# Patient Record
Sex: MALE | Employment: UNEMPLOYED | URBAN - METROPOLITAN AREA
[De-identification: names, ages, dates, MRNs, and addresses within clinical notes are randomized per-mention and may not be internally consistent; named-entity substitution may affect disease eponyms.]

---

## 2017-01-17 ENCOUNTER — OFFICE VISIT (OUTPATIENT)
Dept: DERMATOLOGY | Facility: CLINIC | Age: 22
End: 2017-01-17
Payer: COMMERCIAL

## 2017-01-17 ENCOUNTER — OFFICE VISIT (OUTPATIENT)
Dept: INTERNAL MEDICINE | Facility: CLINIC | Age: 22
End: 2017-01-17
Payer: COMMERCIAL

## 2017-01-17 VITALS
SYSTOLIC BLOOD PRESSURE: 106 MMHG | OXYGEN SATURATION: 98 % | BODY MASS INDEX: 22.63 KG/M2 | HEART RATE: 96 BPM | WEIGHT: 140.8 LBS | DIASTOLIC BLOOD PRESSURE: 76 MMHG | HEIGHT: 66 IN | TEMPERATURE: 98.1 F

## 2017-01-17 VITALS — SYSTOLIC BLOOD PRESSURE: 106 MMHG | DIASTOLIC BLOOD PRESSURE: 75 MMHG | OXYGEN SATURATION: 98 % | HEART RATE: 96 BPM

## 2017-01-17 DIAGNOSIS — B36.0 TV (TINEA VERSICOLOR): ICD-10-CM

## 2017-01-17 DIAGNOSIS — L63.9 ALOPECIA AREATA: Primary | ICD-10-CM

## 2017-01-17 DIAGNOSIS — L63.9 AA (ALOPECIA AREATA): Primary | ICD-10-CM

## 2017-01-17 DIAGNOSIS — L70.0 ACNE VULGARIS: ICD-10-CM

## 2017-01-17 PROCEDURE — 11900 INJECT SKIN LESIONS </W 7: CPT | Performed by: PHYSICIAN ASSISTANT

## 2017-01-17 PROCEDURE — 99203 OFFICE O/P NEW LOW 30 MIN: CPT | Mod: 25 | Performed by: PHYSICIAN ASSISTANT

## 2017-01-17 PROCEDURE — 99213 OFFICE O/P EST LOW 20 MIN: CPT | Performed by: INTERNAL MEDICINE

## 2017-01-17 RX ORDER — FLUOCINOLONE ACETONIDE 0.1 MG/ML
SOLUTION TOPICAL
Qty: 60 ML | Refills: 3 | Status: SHIPPED | OUTPATIENT
Start: 2017-01-17

## 2017-01-17 RX ORDER — TRETINOIN 0.5 MG/G
CREAM TOPICAL
Qty: 45 G | Refills: 11 | Status: SHIPPED | OUTPATIENT
Start: 2017-01-17

## 2017-01-17 RX ORDER — KETOCONAZOLE 20 MG/G
CREAM TOPICAL
Qty: 60 G | Refills: 11 | Status: SHIPPED | OUTPATIENT
Start: 2017-01-17

## 2017-01-17 NOTE — PATIENT INSTRUCTIONS
Alopecia Areata -    I have placed dermatology referrals for you.    Please schedule an appointment at your earliest convenience - phone numbers below.

## 2017-01-17 NOTE — NURSING NOTE
"Chief Complaint   Patient presents with     Hair Loss     from beard       Initial /76 mmHg  Pulse 96  Temp(Src) 98.1  F (36.7  C) (Oral)  Ht 5' 5.5\" (1.664 m)  Wt 140 lb 12.8 oz (63.866 kg)  BMI 23.07 kg/m2  SpO2 98% Estimated body mass index is 23.07 kg/(m^2) as calculated from the following:    Height as of this encounter: 5' 5.5\" (1.664 m).    Weight as of this encounter: 140 lb 12.8 oz (63.866 kg).  BP completed using cuff size: moi Crespo CMA ([facility])      "

## 2017-01-17 NOTE — MR AVS SNAPSHOT
After Visit Summary   1/17/2017    Bere Nunn    MRN: [id number]           Patient Information     Date Of Birth          1995        Visit Information        Provider Department      1/17/2017 2:00 PM Ebony Fox MD [facility]        Today's Diagnoses     Alopecia areata    -  1       Care Instructions    Alopecia Areata -    I have placed dermatology referrals for you.    Please schedule an appointment at your earliest convenience - phone numbers below.                 Follow-ups after your visit        Additional Services     DERMATOLOGY REFERRAL       Your provider has referred you to: FMG: [facility] [phone]   [email or web address]  FHN: [facility] [place] [phone]   [email or web address]  FHN: [facility] [place] [phone]   [email or web address]    Please be aware that coverage of these services is subject to the terms and limitations of your health insurance plan.  Call member services at your health plan with any benefit or coverage questions.      Please bring the following with you to your appointment:    (1) Any X-Rays, CTs or MRIs which have been performed.  Contact the facility where they were done to arrange for  prior to your scheduled appointment.    (2) List of current medications  (3) This referral request   (4) Any documents/labs given to you for this referral                  Who to contact     If you have questions or need follow up information about today's clinic visit or your schedule please contact [facility] directly at [phone].  Normal or non-critical lab and imaging results will be communicated to you by MyChart, letter or phone within 4 business days after the clinic has received the results. If you do not hear from us within 7 days, please contact the clinic through [facility] or  "phone. If you have a critical or abnormal lab result, we will notify you by phone as soon as possible.  Submit refill requests through [employer] or call your pharmacy and they will forward the refill request to us. Please allow 3 business days for your refill to be completed.          Additional Information About Your Visit        [facility] Information     [employer] lets you send messages to your doctor, view your test results, renew your prescriptions, schedule appointments and more. To sign up, go to [email or web address] . Click on \"Log in\" on the left side of the screen, which will take you to the Welcome page. Then click on \"Sign up Now\" on the right side of the page.     You will be asked to enter the access code listed below, as well as some personal information. Please follow the directions to create your username and password.     Your access code is: [email or web address]  Expires: 2017  2:23 PM     Your access code will  in 90 days. If you need help or a new code, please call your [facility] clinic or [phone].        Care EveryWhere ID     This is your Care EveryWhere ID. This could be used by other organizations to access your [facility] medical records  [id number]        Your Vitals Were     Pulse Temperature Height BMI (Body Mass Index) Pulse Oximetry       96 98.1  F (36.7  C) (Oral) 5' 5.5\" (1.664 m) 23.07 kg/m2 98%        Blood Pressure from Last 3 Encounters:   17 106/76   12 116/74    Weight from Last 3 Encounters:   17 140 lb 12.8 oz (63.866 kg)              We Performed the Following     DERMATOLOGY REFERRAL        Primary Care Provider    Physician No Ref-Primary       No address on file        Thank you!     Thank you for choosing [facility]  for your care. Our goal is always to provide you with excellent care. Hearing back from our patients is one way we can continue to improve our services. Please take a few minutes to complete the written survey " that you may receive in the mail after your visit with us. Thank you!             Your Updated Medication List - Protect others around you: Learn how to safely use, store and throw away your medicines at www.disposemymeds.org.          This list is accurate as of: 1/17/17  2:23 PM.  Always use your most recent med list.                   Brand Name Dispense Instructions for use    azithromycin 250 MG tablet    ZITHROMAX Z-GILBERTO    6 tablet    Two tablets on the first day, then one tablet daily for the next 4 days

## 2017-01-17 NOTE — PROGRESS NOTES
"  SUBJECTIVE:                                                      HPI: Bere Nunn is a pleasant 21 year old male who presents with bald spots in beard:    - couple areas of hair loss lower beard  - noticed ~3 weeks ago  - asymptomatic    - no itching or pain   - no redness or rash of hairless areas   - no fevers or chills    - has had before in another area of his beard before   - treated with injections and resolved    - no other areas involved - no scalp or other hair-bearing areas  - has not tried any interventions yet (over-the-counter treatments)    Patient does reports a history of recurrent tinea veriscolor - defers evaluation and treatment of this until summer.     The medication, allergy, and problem lists have been reviewed and updated as appropriate.     OBJECTIVE:                                                      /76 mmHg  Pulse 96  Temp(Src) 98.1  F (36.7  C) (Oral)  Ht 5' 5.5\" (1.664 m)  Wt 140 lb 12.8 oz (63.866 kg)  BMI 23.07 kg/m2  SpO2 98%  Constitutional: well-appearing  Integumentary: several discrete oval shaped patches of smooth hair loss on underside of chin portion of beard; couple ?exclamation point hairs centrally    ASSESSMENT/PLAN:                                                      (L63.9) Alopecia areata  (primary encounter diagnosis)  Comment:    - recurrent.   - discussed the cause and natural history of this diagnosis with patient.    - discussed potential treatment options including topical steroids, steroid injections (what he's had before), and topical minoxidil.     - patient would like to proceed with steroid injection.   Plan: dermatology referral placed - patient to schedule.     The instructions on the AVS were discussed and explained to the patient. Patient expressed understanding of instructions.    Ebony Fox MD   [address] [address]  T: [phone], F: [phone]      "

## 2017-01-17 NOTE — NURSING NOTE
"Chief Complaint   Patient presents with     Derm Problem     alopecia in face - has had it before and had injections and it helped - also tinea versicolor on left should and neck - it flares up in the summer (not flared up right now) - also discuss acne        Initial /75 mmHg  Pulse 96  SpO2 98% Estimated body mass index is 23.07 kg/(m^2) as calculated from the following:    Height as of an earlier encounter on 1/17/17: 1.664 m (5' 5.5\").    Weight as of an earlier encounter on 1/17/17: 63.866 kg (140 lb 12.8 oz)..  BP completed using cuff size: moi Mendoza LPN    "

## 2017-01-18 ENCOUNTER — TELEPHONE (OUTPATIENT)
Dept: DERMATOLOGY | Facility: CLINIC | Age: 22
End: 2017-01-18

## 2017-01-18 NOTE — TELEPHONE ENCOUNTER
Reason for Call:  Other prescription    Detailed comments: Patient want to know what it is for(flucinolone). Please call patient.    Phone Number Patient can be reached at: Home number on file [phone] (home)    Best Time: Anytime    Can we leave a detailed message on this number? YES    Call taken on 1/18/2017 at 12:39 PM by GEOVANNA ARMENDARIZ

## 2017-01-19 NOTE — PROGRESS NOTES
HPI:   Bere Nunn is a 21 year old male who presents for evaluation of hair loss in beard area, possible fungus on back and acne  chief complaint  Location: seeing bald patches in beard. Has had this one time previously.   Fungus has been present during the summer months for the past 2 years. Has not tried any medications on this. Better now that the weather is colder but was very prominent in the summer.   Acne - he has been noticing this more and more.    Condition present for:  As above.   Previous treatments include: had ILK for alopecia areata which worked well. No treatment for anything else  -no personal or fhx of skin CA    -Becoming a rosen currently    Review Of Systems  Eyes: negative  Ears/Nose/Throat: negative  Respiratory: No shortness of breath, dyspnea on exertion, cough, or hemoptysis  Cardiovascular: negative  Gastrointestinal: negative  Genitourinary: negative  Musculoskeletal: negative  Neurologic: negative  Psychiatric: negative        PHYSICAL EXAM:      Skin exam performed as follows: Type 3 skin. Mood appropriate  Alert and Oriented X 3. Well developed, well nourished in no distress.  General appearance: Normal  Head including face: Normal  Eyes: conjunctiva and lids: Normal  Mouth: Lips, teeth, gums: Normal  Neck: Normal  Chest-breast/axillae: Normal  Back: Normal  Spleen and liver: Normal  Cardiovascular: Exam of peripheral vascular system by observation for swelling, varicosities, edema: Normal  Genitalia: groin, buttocks: Normal  Extremities: digits/nails (clubbing): Normal  Eccrine and Apocrine glands: Normal  Right upper extremity: Normal  Left upper extremity: Normal  Right lower extremity: Normal  Left lower extremity: Normal  Skin: Scalp and body hair: See below    1. bald patches with minimal hair located on inferior chin within beard area  2. Hypopigmented macules on back  3. Closed and open comedones on bilateral cheeks      ASSESSMENT/PLAN:     Alopecia Areata: advised on  chronic, recurrent nature of condition and diffficulty in treating. Advised on risk of failure to respond to treatment. Has had this one time previously - did well with ILK and would like this again. Amenable to starting topicals as well  --Kenalog 5 mg/cc injected to inferior chin (beard area). Total of 0.5 cc's used.  Advised on risk of atrophy and failure to respond. Advised on aftercare.   --Start fluocinolone solution QD-BID x 2-3 weeks then PRN  2. Tinea versicolor - advised on diagnosis and treatment options. Discussed that is secondary to fungal overgrowth that tends to worsen with heat and perspiration. Condition does tend be be recurrent. Has tried nothing in the past. Start ketoconazole cream QD-BID PRN    3. Acne Vulgaris - advised on diagnosis and treatment options. Discussed use of topical medications and antibiotics.   --Start tretinoin 0.05% cream QHS as tolerated          Follow-up: 1 month  CC:   Scribed By: Poonam Davis, MS, PALUIS DANIEL

## 2017-02-17 ENCOUNTER — OFFICE VISIT (OUTPATIENT)
Dept: DERMATOLOGY | Facility: CLINIC | Age: 22
End: 2017-02-17
Payer: COMMERCIAL

## 2017-02-17 VITALS — OXYGEN SATURATION: 98 % | DIASTOLIC BLOOD PRESSURE: 73 MMHG | SYSTOLIC BLOOD PRESSURE: 125 MMHG | HEART RATE: 110 BPM

## 2017-02-17 DIAGNOSIS — L63.9 AA (ALOPECIA AREATA): Primary | ICD-10-CM

## 2017-02-17 PROCEDURE — 11900 INJECT SKIN LESIONS </W 7: CPT | Performed by: PHYSICIAN ASSISTANT

## 2017-02-17 NOTE — NURSING NOTE
"Initial /73  Pulse 110  SpO2 98% Estimated body mass index is 23.07 kg/(m^2) as calculated from the following:    Height as of 1/17/17: 1.664 m (5' 5.5\").    Weight as of 1/17/17: 63.9 kg (140 lb 12.8 oz). .      "

## 2017-02-20 NOTE — PROGRESS NOTES
HPI:   Bere Nunn is a 21 year old male who presents for recheck of alopecia areata. Getting a lot of new hair growth but thinks he has a new area. Has been using fluocinolone oil at home daily.    chief complaint  Location: seeing bald patches in beard. Has had this one time previously.    Condition present for:  As above.   Previous treatments include: had ILK for alopecia areata which worked well. No treatment for anything else  -no personal or fhx of skin CA    -Becoming a rosen currently    Review Of Systems  Eyes: negative  Ears/Nose/Throat: negative  Respiratory: No shortness of breath, dyspnea on exertion, cough, or hemoptysis  Cardiovascular: negative  Gastrointestinal: negative  Genitourinary: negative  Musculoskeletal: negative  Neurologic: negative  Psychiatric: negative        PHYSICAL EXAM:      Skin exam performed as follows: Type 3 skin. Mood appropriate  Alert and Oriented X 3. Well developed, well nourished in no distress.  General appearance: Normal  Head including face: Normal  Eyes: conjunctiva and lids: Normal  Mouth: Lips, teeth, gums: Normal  Neck: Normal  Chest-breast/axillae: Normal  Back: Normal  Spleen and liver: Normal  Cardiovascular: Exam of peripheral vascular system by observation for swelling, varicosities, edema: Normal  Genitalia: groin, buttocks: Normal  Extremities: digits/nails (clubbing): Normal  Eccrine and Apocrine glands: Normal  Right upper extremity: Normal  Left upper extremity: Normal  Right lower extremity: Normal  Left lower extremity: Normal  Skin: Scalp and body hair: See below    1. bald patches with minimal hair located on inferior chin within beard area    ASSESSMENT/PLAN:     Alopecia Areata: improving after ILK x 1 and topicals at home. advised on chronic, recurrent nature of condition and diffficulty in treating. Advised on risk of failure to respond to treatment. Has had this one time previously - did well with ILK and would like this again.   --Kenalog  5 mg/cc injected to inferior chin (beard area) and to right lower cheek. Total of 1.0 cc's used.  Advised on risk of atrophy and failure to respond. Advised on aftercare.   --Start fluocinolone solution QD-BID x 2-3 weeks then PRN        Follow-up: 1 month  CC:   Scribed By: Poonam Davis MS, PALUIS DANIEL

## 2017-03-11 ENCOUNTER — TELEPHONE (OUTPATIENT)
Dept: NURSING | Facility: CLINIC | Age: 22
End: 2017-03-11

## 2017-03-11 NOTE — TELEPHONE ENCOUNTER
"Call Type: Triage Call    Presenting Problem: Caller is not currently with patient, so unable  to triage. I am wondering if he needs to go in. He took more than 10  Tylenol 650mg each.\"(not sure the time frame) I advised to call  poison control [phone].  Triage Note:  Guideline Title: No Guideline - Advice Per Reference (Adult)  Recommended Disposition: Call Poison Center Immediately  Original Inclination: Wanted to speak with a nurse  Override Disposition:  Intended Action: Follow advice given  Physician Contacted: No  CALL POISON CENTER IMMEDIATELY ?  YES  SEE ED IMMEDIATELY ? NO  ACTIVATE  ? NO  CALL PROVIDER IMMEDIATELY ? NO  Physician Instructions:  Care Advice:  "

## 2018-05-22 ENCOUNTER — OFFICE VISIT (OUTPATIENT)
Dept: DERMATOLOGY | Facility: CLINIC | Age: 23
End: 2018-05-22
Payer: COMMERCIAL

## 2018-05-22 VITALS — SYSTOLIC BLOOD PRESSURE: 110 MMHG | DIASTOLIC BLOOD PRESSURE: 75 MMHG | HEART RATE: 110 BPM | OXYGEN SATURATION: 97 %

## 2018-05-22 DIAGNOSIS — L63.9 ALOPECIA AREATA: Primary | ICD-10-CM

## 2018-05-22 PROCEDURE — 99212 OFFICE O/P EST SF 10 MIN: CPT | Mod: 25 | Performed by: PHYSICIAN ASSISTANT

## 2018-05-22 PROCEDURE — 11900 INJECT SKIN LESIONS </W 7: CPT | Performed by: PHYSICIAN ASSISTANT

## 2018-05-22 NOTE — MR AVS SNAPSHOT
"              After Visit Summary   5/22/2018    Bere Nunn    MRN: [id number]           Patient Information     Date Of Birth          1995        Visit Information        Provider Department      5/22/2018 3:30 PM Poonam Davis PA-C [facility]        Today's Diagnoses     Alopecia areata    -  1       Follow-ups after your visit        Your next 10 appointments already scheduled     Jun 22, 2018  3:00 PM CDT   Return Visit with Poonam Davis PA-C   [facility] ([facility])    [address] [id number] [phone]              Who to contact     If you have questions or need follow up information about today's clinic visit or your schedule please contact [facility] directly at [phone].  Normal or non-critical lab and imaging results will be communicated to you by MyChart, letter or phone within 4 business days after the clinic has received the results. If you do not hear from us within 7 days, please contact the clinic through MyChart or phone. If you have a critical or abnormal lab result, we will notify you by phone as soon as possible.  Submit refill requests through [employer] or call your pharmacy and they will forward the refill request to us. Please allow 3 business days for your refill to be completed.          Additional Information About Your Visit        MyChart Information     [employer] lets you send messages to your doctor, view your test results, renew your prescriptions, schedule appointments and more. To sign up, go to [email or web address] . Click on \"Log in\" on the left side of the screen, which will take you to the Welcome page. Then click on \"Sign up Now\" on the right side of the page.     You will be asked to enter the access code listed below, as well as some personal information. Please follow the directions to create your username and password.   "   Your access code is: [email or web address]  Expires: 2018  6:42 PM     Your access code will  in 90 days. If you need help or a new code, please call your [place] clinic or [phone].        Care EveryWhere ID     This is your Care EveryWhere ID. This could be used by other organizations to access your [place] medical records  [id number]        Your Vitals Were     Pulse Pulse Oximetry                110 97%           Blood Pressure from Last 3 Encounters:   18 110/75   17 125/73   17 106/75    Weight from Last 3 Encounters:   17 63.9 kg (140 lb 12.8 oz)              We Performed the Following     INJECTION INTO SKIN LESIONS <=7     TRIAMCINOLONE ACET INJ NOS        Primary Care Provider Fax #    Physician No Ref-Primary [phone]       No address on file        Equal Access to Services     [facility]aird HospitalADDIE : Hadii jackie mittal Somac, waaxda zaidaadaha, qaybta kaalmada adeherbyada, myranda canales . So [facility] [phone].    ATENCIÓN: Si habla español, tiene a durán disposición servicios gratuitos de asistencia lingüística. Llame al [phone].    We comply with applicable federal civil rights laws and [place] laws. We do not discriminate on the basis of race, color, national origin, age, disability, sex, sexual orientation, or gender identity.            Thank you!     Thank you for choosing [facility]  for your care. Our goal is always to provide you with excellent care. Hearing back from our patients is one way we can continue to improve our services. Please take a few minutes to complete the written survey that you may receive in the mail after your visit with us. Thank you!             Your Updated Medication List - Protect others around you: Learn how to safely use, store and throw away your medicines at www.disposemymeds.org.          This list is accurate as of 18  6:42 PM.  Always use your most recent med list.                    Brand Name Dispense Instructions for use Diagnosis    fluocinolone 0.01 % solution    SYNALAR    60 mL    Apply to AA in beard BID x 3-4 weeks then PRN    AA (alopecia areata)       ketoconazole 2 % cream    NIZORAL    60 g    Apply to AA BID PRN    TV (tinea versicolor)       tretinoin 0.05 % cream    RETIN-A    45 g    Spread a pea size amount into affected area topically at bedtime.  Use sunscreen SPF>20.    Acne vulgaris

## 2018-05-22 NOTE — LETTER
5/22/2018         RE: Bere Nunn  [address] [address] [id number]        Dear Colleague,    Thank you for referring your patient, Bere Nunn, to the [facility]. Please see a copy of my visit note below.    HPI:   Bere Nunn is a 22 year old male who presents for recheck of alopecia areata. Getting a lot of new hair growth from kenalog injections . Several new bald spots appearing recently in the beard area.       chief complaint  Location: beard - right and left sides  Condition present for:  As above.   Previous treatments include: had ILK for alopecia areata which worked well. No treatment for anything else  -no personal or fhx of skin CA    -Becoming a rosen currently    Review Of Systems  Eyes: negative  Ears/Nose/Throat: negative  Respiratory: No shortness of breath, dyspnea on exertion, cough, or hemoptysis  Cardiovascular: negative  Gastrointestinal: negative  Genitourinary: negative  Musculoskeletal: negative  Neurologic: negative  Psychiatric: negative    This document serves as a record of the services and decisions personally performed and made by Poonam Davis, MS, PA-C. It was created on her behalf by Jennie Hicks, a trained medical scribe. The creation of this document is based on the provider's statements to the medical scribe.  Jennie Hicks 4:01 PM May 22, 2018    PHYSICAL EXAM:   /75  Pulse 110  SpO2 97%  Skin exam performed as follows: Type 3 skin. Mood appropriate  Alert and Oriented X 3. Well developed, well nourished in no distress.  General appearance: Normal  Head including face: Normal  Eyes: conjunctiva and lids: Normal  Mouth: Lips, teeth, gums: Normal  Neck: Normal  Chest-breast/axillae: Normal  Back: Normal  Spleen and liver: Normal  Cardiovascular: Exam of peripheral vascular system by observation for swelling, varicosities, edema: Normal  Genitalia: groin, buttocks: Normal  Extremities: digits/nails  (clubbing): Normal  Eccrine and Apocrine glands: Normal  Right upper extremity: Normal  Left upper extremity: Normal  Right lower extremity: Normal  Left lower extremity: Normal  Skin: Scalp and body hair: See below    1. bald patches with minimal hair located on inferior chin within beard area    ASSESSMENT/PLAN:     Alopecia Areata:. advised on chronic, recurrent nature of condition and diffficulty in treating. Advised on risk of failure to respond to treatment. Has had this twice previously - did well with ILK and would like this again.   --Kenalog 5 mg/cc injected to beard area. Total of 1 cc's used.  Advised on risk of atrophy and failure to respond. Advised on aftercare.     The following medication was given:     MEDICATION:kenalog  ROUTE: ID  SITE:face  DOSE: 5 mg/cc    Follow-up: 1 month  CC:   Scribed By:Jennie Hicks Medical Scribe     The information in this document, created by the medical scribe for me, accurately reflects the services I personally performed and the decisions made by me. I have reviewed and approved this document for accuracy prior to leaving the patient care area.  May 22, 2018 4:08 PM    Poonam Davis MS, GWYN        Again, thank you for allowing me to participate in the care of your patient.        Sincerely,        Poonam Davis PA-C

## 2018-05-22 NOTE — PROGRESS NOTES
HPI:   Bere Nunn is a 22 year old male who presents for recheck of alopecia areata. Getting a lot of new hair growth from kenalog injections . Several new bald spots appearing recently in the beard area.       chief complaint  Location: beard - right and left sides  Condition present for:  As above.   Previous treatments include: had ILK for alopecia areata which worked well. No treatment for anything else  -no personal or fhx of skin CA    -Becoming a rosen currently    Review Of Systems  Eyes: negative  Ears/Nose/Throat: negative  Respiratory: No shortness of breath, dyspnea on exertion, cough, or hemoptysis  Cardiovascular: negative  Gastrointestinal: negative  Genitourinary: negative  Musculoskeletal: negative  Neurologic: negative  Psychiatric: negative    This document serves as a record of the services and decisions personally performed and made by Poonam Davis, MS, PA-C. It was created on her behalf by Jennie Hicks, a trained medical scribe. The creation of this document is based on the provider's statements to the medical scribe.  Jennie Hicks 4:01 PM May 22, 2018    PHYSICAL EXAM:   /75  Pulse 110  SpO2 97%  Skin exam performed as follows: Type 3 skin. Mood appropriate  Alert and Oriented X 3. Well developed, well nourished in no distress.  General appearance: Normal  Head including face: Normal  Eyes: conjunctiva and lids: Normal  Mouth: Lips, teeth, gums: Normal  Neck: Normal  Chest-breast/axillae: Normal  Back: Normal  Spleen and liver: Normal  Cardiovascular: Exam of peripheral vascular system by observation for swelling, varicosities, edema: Normal  Genitalia: groin, buttocks: Normal  Extremities: digits/nails (clubbing): Normal  Eccrine and Apocrine glands: Normal  Right upper extremity: Normal  Left upper extremity: Normal  Right lower extremity: Normal  Left lower extremity: Normal  Skin: Scalp and body hair: See below    1. bald patches with minimal hair  located on inferior chin within beard area    ASSESSMENT/PLAN:     Alopecia Areata:. advised on chronic, recurrent nature of condition and diffficulty in treating. Advised on risk of failure to respond to treatment. Has had this twice previously - did well with ILK and would like this again.   --Kenalog 5 mg/cc injected to beard area. Total of 1 cc's used.  Advised on risk of atrophy and failure to respond. Advised on aftercare.     The following medication was given:     MEDICATION:kenalog  ROUTE: ID  SITE:face  DOSE: 5 mg/cc    Follow-up: 1 month  CC:   Scribed By:Jennie Hicks, Medical Scribe     The information in this document, created by the medical scribe for me, accurately reflects the services I personally performed and the decisions made by me. I have reviewed and approved this document for accuracy prior to leaving the patient care area.  May 22, 2018 4:08 PM    Poonam Davis MS, PAYaronC

## 2018-10-19 ENCOUNTER — OFFICE VISIT (OUTPATIENT)
Dept: DERMATOLOGY | Facility: CLINIC | Age: 23
End: 2018-10-19
Payer: COMMERCIAL

## 2018-10-19 VITALS — SYSTOLIC BLOOD PRESSURE: 124 MMHG | HEART RATE: 107 BPM | OXYGEN SATURATION: 95 % | DIASTOLIC BLOOD PRESSURE: 82 MMHG

## 2018-10-19 DIAGNOSIS — L63.9 AA (ALOPECIA AREATA): Primary | ICD-10-CM

## 2018-10-19 PROCEDURE — 11900 INJECT SKIN LESIONS </W 7: CPT | Performed by: PHYSICIAN ASSISTANT

## 2018-10-19 NOTE — PROGRESS NOTES
HPI:   Bere Nunn is a 22 year old male who presents for recheck of alopecia areata. Several new bald spots appearing recently in the beard area. In the past has responded very well to ILK.   chief complaint  Location: beard - right and left sides  Condition present for:  As above.   Previous treatments include: had ILK for alopecia areata which worked well. No treatment for anything else  -no personal or fhx of skin CA    -Becoming a rosen currently. From [facility] originally     Review Of Systems  Eyes: negative  Ears/Nose/Throat: negative  Respiratory: No shortness of breath, dyspnea on exertion, cough, or hemoptysis  Cardiovascular: negative  Gastrointestinal: negative  Genitourinary: negative  Musculoskeletal: negative  Neurologic: negative  Psychiatric: negative    This document serves as a record of the services and decisions personally performed and made by Poonam Davis, MS, PA-C. It was created on her behalf by Jennie Hicks, a trained medical scribe. The creation of this document is based on the provider's statements to the medical scribe.  Jennie Hicks 4:01 PM May 22, 2018    PHYSICAL EXAM:   /82  Pulse 107  SpO2 95%  Skin exam performed as follows: Type 3 skin. Mood appropriate  Alert and Oriented X 3. Well developed, well nourished in no distress.  General appearance: Normal  Head including face: Normal  Eyes: conjunctiva and lids: Normal  Mouth: Lips, teeth, gums: Normal  Neck: Normal  Chest-breast/axillae: Normal  Back: Normal  Spleen and liver: Normal  Cardiovascular: Exam of peripheral vascular system by observation for swelling, varicosities, edema: Normal  Genitalia: groin, buttocks: Normal  Extremities: digits/nails (clubbing): Normal  Eccrine and Apocrine glands: Normal  Right upper extremity: Normal  Left upper extremity: Normal  Right lower extremity: Normal  Left lower extremity: Normal  Skin: Scalp and body hair: See below    1. bald patches with  minimal hair located on inferior chin within beard area (3 total)    ASSESSMENT/PLAN:     Alopecia Areata:. advised on chronic, recurrent nature of condition and diffficulty in treating. Advised on risk of failure to respond to treatment. Has had this twice previously - did well with ILK and would like this again.   --Kenalog 10 mg/cc injected to beard area. Total of 1 cc's used.  Advised on risk of atrophy and failure to respond. Advised on aftercare.     The following medication was given:     MEDICATION:kenalog  ROUTE: ID  SITE:face  DOSE: 5 mg/cc    Follow-up: 1 month if needed/PRN  CC:   Scribed By:Jennie Hicks, Medical Scribe     The information in this document, created by the medical scribe for me, accurately reflects the services I personally performed and the decisions made by me. I have reviewed and approved this document for accuracy prior to leaving the patient care area.  May 22, 2018 4:08 PM    Poonam Davis MS, PA-C

## 2018-10-19 NOTE — MR AVS SNAPSHOT
After Visit Summary   10/19/2018    Bere Nunn    MRN: [id number]           Patient Information     Date Of Birth          1995        Visit Information        Provider Department      10/19/2018 1:30 PM Poonam Davis PA-C [facility]        Today's Diagnoses     AA (alopecia areata)    -  1       Follow-ups after your visit        Who to contact     If you have questions or need follow up information about today's clinic visit or your schedule please contact [facility] directly at [phone].  Normal or non-critical lab and imaging results will be communicated to you by MyChart, letter or phone within 4 business days after the clinic has received the results. If you do not hear from us within 7 days, please contact the clinic through MyChart or phone. If you have a critical or abnormal lab result, we will notify you by phone as soon as possible.  Submit refill requests through [employer] or call your pharmacy and they will forward the refill request to us. Please allow 3 business days for your refill to be completed.          Additional Information About Your Visit        Care EveryWhere ID     This is your Care EveryWhere ID. This could be used by other organizations to access your [facility] medical records  [id number]        Your Vitals Were     Pulse Pulse Oximetry                107 95%           Blood Pressure from Last 3 Encounters:   10/19/18 124/82   05/22/18 110/75   02/17/17 125/73    Weight from Last 3 Encounters:   01/17/17 63.9 kg (140 lb 12.8 oz)              We Performed the Following     INJECTION INTO SKIN LESIONS <=7     TRIAMCINOLONE ACET INJ NOS        Primary Care Provider Fax #    Physician No Ref-Primary [phone]       No address on file        Equal Access to Services     ELAYNE BLAIR : Dylan Gill, liza grace, myranda gama. [facility]  [phone].    ATENCIÓN: Si hien medrano, tiene a durán disposición servicios gratuitos de asistencia lingüística. Quinn ellis [phone].    We comply with applicable federal civil rights laws and [place] laws. We do not discriminate on the basis of race, color, national origin, age, disability, sex, sexual orientation, or gender identity.            Thank you!     Thank you for choosing [facility]  for your care. Our goal is always to provide you with excellent care. Hearing back from our patients is one way we can continue to improve our services. Please take a few minutes to complete the written survey that you may receive in the mail after your visit with us. Thank you!             Your Updated Medication List - Protect others around you: Learn how to safely use, store and throw away your medicines at www.disposemymeds.org.          This list is accurate as of 10/19/18  1:42 PM.  Always use your most recent med list.                   Brand Name Dispense Instructions for use Diagnosis    fluocinolone 0.01 % solution    SYNALAR    60 mL    Apply to AA in beard BID x 3-4 weeks then PRN    AA (alopecia areata)       ketoconazole 2 % cream    NIZORAL    60 g    Apply to AA BID PRN    TV (tinea versicolor)       tretinoin 0.05 % cream    RETIN-A    45 g    Spread a pea size amount into affected area topically at bedtime.  Use sunscreen SPF>20.    Acne vulgaris

## 2018-10-19 NOTE — LETTER
10/19/2018         RE: Bere Nunn  [address] [place] [address]        Dear Colleague,    Thank you for referring your patient, Bere Nunn, to the [facility]. Please see a copy of my visit note below.    HPI:   Bere Nunn is a 22 year old male who presents for recheck of alopecia areata. Several new bald spots appearing recently in the beard area. In the past has responded very well to ILK.   chief complaint  Location: beard - right and left sides  Condition present for:  As above.   Previous treatments include: had ILK for alopecia areata which worked well. No treatment for anything else  -no personal or fhx of skin CA    -Becoming a rosen currently. [facility]     Review Of Systems  Eyes: negative  Ears/Nose/Throat: negative  Respiratory: No shortness of breath, dyspnea on exertion, cough, or hemoptysis  Cardiovascular: negative  Gastrointestinal: negative  Genitourinary: negative  Musculoskeletal: negative  Neurologic: negative  Psychiatric: negative    This document serves as a record of the services and decisions personally performed and made by Poonam Davis, MS, PA-C. It was created on her behalf by Jennie Hicks, a trained medical scribe. The creation of this document is based on the provider's statements to the medical scribe.  Jennie Hicks 4:01 PM May 22, 2018    PHYSICAL EXAM:   /82  Pulse 107  SpO2 95%  Skin exam performed as follows: Type 3 skin. Mood appropriate  Alert and Oriented X 3. Well developed, well nourished in no distress.  General appearance: Normal  Head including face: Normal  Eyes: conjunctiva and lids: Normal  Mouth: Lips, teeth, gums: Normal  Neck: Normal  Chest-breast/axillae: Normal  Back: Normal  Spleen and liver: Normal  Cardiovascular: Exam of peripheral vascular system by observation for swelling, varicosities, edema: Normal  Genitalia: groin, buttocks: Normal  Extremities:  digits/nails (clubbing): Normal  Eccrine and Apocrine glands: Normal  Right upper extremity: Normal  Left upper extremity: Normal  Right lower extremity: Normal  Left lower extremity: Normal  Skin: Scalp and body hair: See below    1. bald patches with minimal hair located on inferior chin within beard area (3 total)    ASSESSMENT/PLAN:     Alopecia Areata:. advised on chronic, recurrent nature of condition and diffficulty in treating. Advised on risk of failure to respond to treatment. Has had this twice previously - did well with ILK and would like this again.   --Kenalog 10 mg/cc injected to beard area. Total of 1 cc's used.  Advised on risk of atrophy and failure to respond. Advised on aftercare.     The following medication was given:     MEDICATION:kenalog  ROUTE: ID  SITE:face  DOSE: 5 mg/cc    Follow-up: 1 month if needed/PRN  CC:   Scribed By:Jennie Hicks, Medical Scribe     The information in this document, created by the medical scribe for me, accurately reflects the services I personally performed and the decisions made by me. I have reviewed and approved this document for accuracy prior to leaving the patient care area.  May 22, 2018 4:08 PM    Poonam Davis MS, PALUIS DANIEL        Again, thank you for allowing me to participate in the care of your patient.        Sincerely,        Poonam Davis PA-C

## 2020-01-06 ENCOUNTER — HOSPITAL ENCOUNTER (EMERGENCY)
Facility: CLINIC | Age: 25
Discharge: HOME | End: 2020-01-06
Attending: NURSE PRACTITIONER | Admitting: NURSE PRACTITIONER

## 2020-01-06 VITALS
SYSTOLIC BLOOD PRESSURE: 115 MMHG | OXYGEN SATURATION: 98 % | TEMPERATURE: 99.4 F | DIASTOLIC BLOOD PRESSURE: 88 MMHG | RESPIRATION RATE: 18 BRPM | HEART RATE: 104 BPM

## 2020-01-06 DIAGNOSIS — J10.1 INFLUENZA B: ICD-10-CM

## 2020-01-06 LAB
FLUAV+FLUBV AG SPEC QL: NEGATIVE
FLUAV+FLUBV AG SPEC QL: POSITIVE
SPECIMEN SOURCE: ABNORMAL

## 2020-01-06 PROCEDURE — 99283 EMERGENCY DEPT VISIT LOW MDM: CPT

## 2020-01-06 PROCEDURE — 87804 INFLUENZA ASSAY W/OPTIC: CPT | Performed by: EMERGENCY MEDICINE

## 2020-01-06 ASSESSMENT — ENCOUNTER SYMPTOMS
RHINORRHEA: 1
HEADACHES: 1
CHILLS: 1
FEVER: 1
ABDOMINAL PAIN: 0
COUGH: 1
WHEEZING: 0
MYALGIAS: 1

## 2020-01-06 NOTE — DISCHARGE INSTRUCTIONS
Again you have influenza.  Continue to keep well-hydrated.  Use of ibuprofen and over-the-counter cough and cold medicine such as DayQuil/NyQuil.  If you are congested I recommend adding in Sudafed as well.  This is the kind that she have to get behind the counter.  You just have to show your 's license to get it.  Do not exceed 4000 mg of Tylenol daily.  You may also use cough drops, hot tea honey and lemon etc.

## 2020-01-06 NOTE — ED AVS SNAPSHOT
[facility] Emergency Department  [address]  [facility] [address]  Phone:  [phone]  Fax:  [phone]                                    Bere Nunn   MRN: [id number]    Department:  [facility] Emergency Department   Date of Visit:  1/6/2020           After Visit Summary Signature Page    I have received my discharge instructions, and my questions have been answered. I have discussed any challenges I see with this plan with the nurse or doctor.    ..........................................................................................................................................  Patient/Patient Representative Signature      ..........................................................................................................................................  Patient Representative Print Name and Relationship to Patient    ..................................................               ................................................  Date                                   Time    ..........................................................................................................................................  Reviewed by Signature/Title    ...................................................              ..............................................  Date                                               Time          22EPIC Rev 08/18

## 2020-01-06 NOTE — ED PROVIDER NOTES
History     Chief Complaint:  Fever and Cough     HPI   Bere Nunn is a 24 year old male who presents for evaluation of a fever and a cough. On 1/3/2020 the patient started to develop a fever, chills, congestion, rhinorrhea, a cough, generalized myalgias, and a headache. He has been taking Mucinex at home with limited improvement of his symptoms. Due to his persistent symptoms today, the patient came into the ED for evaluation.     Allergies:  NKDA      Medications:    Synalar  Nizoral   Retin-A     Past Medical History:    The patient denies any relevant past medical history.     Past Surgical History:    History reviewed. No pertinent past surgical history.     Family History:    History reviewed. No pertinent family history.     Social History:  Tobacco use:    Current every day smoker - 0.50 packs / day   Alcohol use:    Positive   Drug use:    Negative   Marital status:    Single   Accompanied to ED by:  Family member      Review of Systems   Constitutional: Positive for chills and fever.   HENT: Positive for congestion and rhinorrhea.    Respiratory: Positive for cough. Negative for wheezing.    Gastrointestinal: Negative for abdominal pain.   Musculoskeletal: Positive for myalgias.   Skin: Negative for rash.   Neurological: Positive for headaches.   All other systems reviewed and are negative.      Physical Exam   First Vitals:  BP: 115/88  Pulse: 104  Heart Rate: 104  Temp: 99.4  F (37.4  C)  Resp: 18  SpO2: 98 %      Physical Exam  General: Alert, Mild  discomfort, well kept  Eyes: PERRL, conjunctivae pink no scleral icterus or conjunctival injection  ENT:   Moist mucus membranes, posterior oropharynx clear without erythema or exudates, No lymphadenopathy, Normal voice  Resp:  Lungs clear to auscultation bilaterally, no crackles/rubs/wheezes. Good air movement, course dry cough  CV:  Normal rate and rhythm, no murmurs/rubs/gallops  GI:  Abdomen soft and non-distended.  Normoactive BS.  No tenderness,  guarding or rebound, No masses  Skin:  Warm, dry.  No rashes or petechiae  Musculoskeletal: No peripheral edema or calf tenderness, Normal gross ROM   Neuro: Alert and oriented to person/place/time, normal sensation  Psychiatric: Normal affect, cooperative, good eye contact    Emergency Department Course     Laboratory:  Influenza A/B antigen: B positive, A negative     Emergency Department Course:  Nursing notes and vitals reviewed.  1520: I performed an exam of the patient as documented above.     I personally reviewed the laboratory results with the patient and answered all related questions prior to discharge.     Findings and plan explained to the Patient. Patient discharged home with instructions regarding supportive care, medications, and reasons to return. The importance of close follow-up was reviewed.     Impression & Plan      Medical Decision Making:  Bere Nunn is a 24 year old male presents for evaluation of upper respiratory symptoms. Patient is concerned for continued URI symptoms. Evaluation consisted of Physical exam and rapid flu: positive. Non specific viral findings. Exam consistent with influenza. No evidence of sepsis. No meningismus. Xray not indicated. Discharged with advice for symptomatic treatment including over the counter medication such as OTC daytime/night time cold medicine and Ibuprofen.  Advised to follow up with primary care provider in 5-7 days if continued symptoms, sooner if worsening. Patient will return to the ER/UR if they develop high fevers not controlled with medication, difficulty breathing, shortness of breath, or has other concerns.      Diagnosis:    ICD-10-CM   1. Influenza B J10.1     Disposition:  Discharged to home.       Rm CRAWFORD, am serving as a scribe at 3:20 PM on 1/6/2020 to document services personally performed by Tl Cantu NP, based on my observations and the provider's statements to me.    [facility] EMERGENCY DEPARTMENT        Pepe, Tl Oliver, APRN CNP  01/06/20 1554

## 2020-01-06 NOTE — ED TRIAGE NOTES
Pt with cough and fevers since Friday night, taking Mucinex at home with no relief. ABC's intact, alert and oriented x3.